# Patient Record
Sex: FEMALE | Race: WHITE | NOT HISPANIC OR LATINO
[De-identification: names, ages, dates, MRNs, and addresses within clinical notes are randomized per-mention and may not be internally consistent; named-entity substitution may affect disease eponyms.]

---

## 2018-11-27 PROBLEM — Z00.00 ENCOUNTER FOR PREVENTIVE HEALTH EXAMINATION: Status: ACTIVE | Noted: 2018-11-27

## 2018-12-06 ENCOUNTER — APPOINTMENT (OUTPATIENT)
Dept: ORTHOPEDIC SURGERY | Facility: CLINIC | Age: 83
End: 2018-12-06

## 2021-09-08 RX ORDER — LIOTHYRONINE SODIUM 5 UG/1
5 TABLET ORAL
Refills: 0 | Status: ACTIVE | COMMUNITY

## 2021-09-08 RX ORDER — MEMANTINE HYDROCHLORIDE 28 MG/1
28 CAPSULE, EXTENDED RELEASE ORAL
Refills: 0 | Status: ACTIVE | COMMUNITY

## 2021-09-08 RX ORDER — DULOXETINE HYDROCHLORIDE 30 MG/1
30 CAPSULE, DELAYED RELEASE PELLETS ORAL
Refills: 0 | Status: ACTIVE | COMMUNITY

## 2021-09-13 ENCOUNTER — APPOINTMENT (OUTPATIENT)
Dept: HEART AND VASCULAR | Facility: CLINIC | Age: 86
End: 2021-09-13
Payer: MEDICARE

## 2021-09-27 ENCOUNTER — APPOINTMENT (OUTPATIENT)
Dept: HEART AND VASCULAR | Facility: CLINIC | Age: 86
End: 2021-09-27
Payer: MEDICARE

## 2021-09-27 ENCOUNTER — NON-APPOINTMENT (OUTPATIENT)
Age: 86
End: 2021-09-27

## 2021-09-27 VITALS
OXYGEN SATURATION: 96 % | HEIGHT: 65 IN | DIASTOLIC BLOOD PRESSURE: 56 MMHG | SYSTOLIC BLOOD PRESSURE: 99 MMHG | BODY MASS INDEX: 22.49 KG/M2 | WEIGHT: 135 LBS | HEART RATE: 63 BPM

## 2021-09-27 DIAGNOSIS — F03.90 UNSPECIFIED DEMENTIA W/OUT BEHAVIORAL DISTURBANCE: ICD-10-CM

## 2021-09-27 DIAGNOSIS — Z87.891 PERSONAL HISTORY OF NICOTINE DEPENDENCE: ICD-10-CM

## 2021-09-27 DIAGNOSIS — Z63.4 DISAPPEARANCE AND DEATH OF FAMILY MEMBER: ICD-10-CM

## 2021-09-27 PROCEDURE — 93000 ELECTROCARDIOGRAM COMPLETE: CPT

## 2021-09-27 PROCEDURE — 99204 OFFICE O/P NEW MOD 45 MIN: CPT | Mod: 25

## 2021-09-27 RX ORDER — LISINOPRIL 2.5 MG/1
2.5 TABLET ORAL
Qty: 90 | Refills: 0 | Status: ACTIVE | COMMUNITY
Start: 2021-07-06

## 2021-09-27 RX ORDER — METOPROLOL SUCCINATE 25 MG/1
25 TABLET, EXTENDED RELEASE ORAL
Qty: 90 | Refills: 0 | Status: ACTIVE | COMMUNITY
Start: 2021-07-06

## 2021-09-27 RX ORDER — ASPIRIN ENTERIC COATED TABLETS 81 MG 81 MG/1
81 TABLET, DELAYED RELEASE ORAL
Qty: 30 | Refills: 0 | Status: ACTIVE | COMMUNITY
Start: 2021-06-07

## 2021-09-27 RX ORDER — LEVOTHYROXINE SODIUM 0.07 MG/1
75 TABLET ORAL
Qty: 45 | Refills: 0 | Status: ACTIVE | COMMUNITY
Start: 2021-02-05

## 2021-09-27 RX ORDER — ATORVASTATIN CALCIUM 40 MG/1
40 TABLET, FILM COATED ORAL DAILY
Qty: 90 | Refills: 3 | Status: ACTIVE | COMMUNITY
Start: 2021-08-10 | End: 1900-01-01

## 2021-09-27 SDOH — SOCIAL STABILITY - SOCIAL INSECURITY: DISSAPEARANCE AND DEATH OF FAMILY MEMBER: Z63.4

## 2021-09-27 NOTE — ASSESSMENT
[FreeTextEntry1] : 1. 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 stress-induced CM\par       - will continue lisinopril 2.5mg po qd (will not up titrate secondary to relative hypotension)\par       - continue Toprol XL 25mg po qd (will not up titrate secondary to relative hypotension)\par       - will send for a repeat echocardiogram at time of next visit in 2 months to evaluate for persistent LV dysfunction\par \par 2. CAD: s/p 06/01/21: at [facility]: LAD mid 65% (DFR 0.93), otherwise mild diffuse disease. \par      - will pursue aggressive medical management\par      - continue ASA 81mg po qd\par      - continue atorvastatin 40mg po qd  \par \par 3. Left bundle branch block: likely secondary to nonischemic CM\par      - will consider ordering an MCT monitor in the future to rule out more advanced block\par \par \par \par An ECG was obtained to evaluate heart rhythm and screen for any underlying cardiac abnormalities.

## 2021-09-27 NOTE — REASON FOR VISIT
[Other: ____] : [unfilled] [FreeTextEntry1] : Diagnostic Tests:\par ----------------------------------\par ECG:\par 09/27/21: sinus rhythm, LBBB. \par 06/01/21: sinus rhythm, LBBB. \par ----------------------------------\par Cath:\par 06/01/21: at [facility]: LAD mid 65% (DFR 0.93), otherwise mild diffuse disease. \par ----------------------------------\par Echo:\par 05/30/21: EF 30%, LVH, mid and apical segments akinetic, mild-to-moderate MR, aortic sclerosis, mild AI, moderate TR.

## 2021-09-27 NOTE — REVIEW OF SYSTEMS
[SOB] : shortness of breath [Dyspnea on exertion] : dyspnea during exertion [Confusion] : confusion was observed

## 2021-10-11 ENCOUNTER — NON-APPOINTMENT (OUTPATIENT)
Age: 86
End: 2021-10-11

## 2021-10-14 ENCOUNTER — APPOINTMENT (OUTPATIENT)
Dept: HEART AND VASCULAR | Facility: CLINIC | Age: 86
End: 2021-10-14

## 2021-11-29 ENCOUNTER — APPOINTMENT (OUTPATIENT)
Dept: HEART AND VASCULAR | Facility: CLINIC | Age: 86
End: 2021-11-29

## 2021-11-29 DIAGNOSIS — I25.10 ATHEROSCLEROTIC HEART DISEASE OF NATIVE CORONARY ARTERY W/OUT ANGINA PECTORIS: ICD-10-CM

## 2021-11-29 DIAGNOSIS — I44.7 LEFT BUNDLE-BRANCH BLOCK, UNSPECIFIED: ICD-10-CM

## 2021-11-29 DIAGNOSIS — I50.22 CHRONIC SYSTOLIC (CONGESTIVE) HEART FAILURE: ICD-10-CM

## 2021-11-29 NOTE — REASON FOR VISIT
[FreeTextEntry1] : Diagnostic Tests:\par ----------------------------------\par ECG:\par 09/27/21: sinus rhythm, LBBB. \par 06/01/21: sinus rhythm, LBBB. \par ----------------------------------\par Cath:\par 06/01/21: at [facility]: LAD mid 65% (DFR 0.93), otherwise mild diffuse disease. \par ----------------------------------\par Echo:\par 05/30/21: EF 30%, LVH, mid and apical segments akinetic, mild-to-moderate MR, aortic sclerosis, mild AI, moderate TR.

## 2021-11-29 NOTE — HISTORY OF PRESENT ILLNESS
[FreeTextEntry1] : Ms. Brown presents for follow up and management of alcoholism, HTN, dyslipidemia, LBBB, dementia, chronic systolic heart failure secondary to nonischemic cardiomyopathy, and moderate nonobstructive CAD.  She was previously followed by Theodore Torres MD.  She was admitted to [facility] on 05/30/21 after a mechanical fall which resulted in a right hip fracture.  In the course of the preoperative work up she had an echocardiogram which revealed an EF of 30%, LVH, mid and apical segment akinesis, mild-to-moderate MR, aortic sclerosis, mild AI, and moderate TR.  She then went on to have an invasive coronary angiogram on 06/01/21 which revealed a moderate mid LAD lesion and otherwise mild diffuse disease.  Although there is no indication from [facility] it would appear she may have had stress cardiomyopathy. She denies chest pain but has complaints of SOB.

## 2021-11-29 NOTE — ASSESSMENT
[FreeTextEntry1] : 1. 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 stress-induced CM\par       - will continue lisinopril 2.5mg po qd (will not up titrate secondary to relative hypotension)\par       - continue Toprol XL 25mg po qd (will not up titrate secondary to relative hypotension)\par       - will send for a repeat echocardiogram at time of next visit in 2 months to evaluate for persistent LV dysfunction\par \par 2. CAD: s/p 06/01/21: at [facility]: LAD mid 65% (DFR 0.93), otherwise mild diffuse disease. \par      - will pursue aggressive medical management\par      - continue ASA 81mg po qd\par      - continue atorvastatin 40mg po qd  \par \par 3. Left bundle branch block: likely secondary to nonischemic CM\par      - will consider ordering an MCT monitor in the future to rule out more advanced block\par \par \par